# Patient Record
Sex: FEMALE | Race: WHITE | Employment: OTHER | URBAN - METROPOLITAN AREA
[De-identification: names, ages, dates, MRNs, and addresses within clinical notes are randomized per-mention and may not be internally consistent; named-entity substitution may affect disease eponyms.]

---

## 2019-03-22 ENCOUNTER — APPOINTMENT (OUTPATIENT)
Dept: GENERAL RADIOLOGY | Age: 84
DRG: 064 | End: 2019-03-22
Payer: MEDICARE

## 2019-03-22 ENCOUNTER — HOSPITAL ENCOUNTER (INPATIENT)
Age: 84
LOS: 4 days | Discharge: NURSING, REHAB OR LONG-TERM CARE | DRG: 064 | End: 2019-03-26
Attending: EMERGENCY MEDICINE
Payer: MEDICARE

## 2019-03-22 ENCOUNTER — APPOINTMENT (OUTPATIENT)
Dept: CT IMAGING | Age: 84
DRG: 064 | End: 2019-03-22
Payer: MEDICARE

## 2019-03-22 DIAGNOSIS — I63.511 ACUTE ISCHEMIC RIGHT MCA STROKE (HCC): Primary | ICD-10-CM

## 2019-03-22 PROBLEM — I63.9 ISCHEMIC STROKE (HCC): Status: ACTIVE | Noted: 2019-03-22

## 2019-03-22 PROBLEM — G45.9 TIA (TRANSIENT ISCHEMIC ATTACK): Status: ACTIVE | Noted: 2019-03-22

## 2019-03-22 LAB
ALBUMIN SERPL-MCNC: 3.1 G/DL (ref 3.4–5)
ALP BLD-CCNC: 61 U/L (ref 40–129)
ALT SERPL-CCNC: 11 U/L (ref 10–40)
ANION GAP SERPL CALCULATED.3IONS-SCNC: 11 MMOL/L (ref 3–16)
AST SERPL-CCNC: 23 U/L (ref 15–37)
BASOPHILS ABSOLUTE: 0.1 K/UL (ref 0–0.2)
BASOPHILS RELATIVE PERCENT: 1 %
BILIRUB SERPL-MCNC: 0.3 MG/DL (ref 0–1)
BILIRUBIN DIRECT: <0.2 MG/DL (ref 0–0.3)
BILIRUBIN, INDIRECT: ABNORMAL MG/DL (ref 0–1)
BUN BLDV-MCNC: 24 MG/DL (ref 7–20)
CALCIUM SERPL-MCNC: 8.8 MG/DL (ref 8.3–10.6)
CHLORIDE BLD-SCNC: 103 MMOL/L (ref 99–110)
CO2: 25 MMOL/L (ref 21–32)
CREAT SERPL-MCNC: 0.6 MG/DL (ref 0.6–1.2)
EKG ATRIAL RATE: 234 BPM
EKG DIAGNOSIS: NORMAL
EKG P AXIS: 93 DEGREES
EKG Q-T INTERVAL: 430 MS
EKG QRS DURATION: 98 MS
EKG QTC CALCULATION (BAZETT): 477 MS
EKG R AXIS: -45 DEGREES
EKG T AXIS: 55 DEGREES
EKG VENTRICULAR RATE: 74 BPM
EOSINOPHILS ABSOLUTE: 0.3 K/UL (ref 0–0.6)
EOSINOPHILS RELATIVE PERCENT: 5.7 %
GFR AFRICAN AMERICAN: >60
GFR NON-AFRICAN AMERICAN: >60
GLUCOSE BLD-MCNC: 117 MG/DL (ref 70–99)
GLUCOSE BLD-MCNC: 190 MG/DL (ref 70–99)
GLUCOSE BLD-MCNC: 79 MG/DL (ref 70–99)
HCT VFR BLD CALC: 30.2 % (ref 36–48)
HEMOGLOBIN: 9.7 G/DL (ref 12–16)
INR BLD: 1.07 (ref 0.86–1.14)
LACTIC ACID: 1.7 MMOL/L (ref 0.4–2)
LYMPHOCYTES ABSOLUTE: 0.8 K/UL (ref 1–5.1)
LYMPHOCYTES RELATIVE PERCENT: 17.2 %
MCH RBC QN AUTO: 27.7 PG (ref 26–34)
MCHC RBC AUTO-ENTMCNC: 31.9 G/DL (ref 31–36)
MCV RBC AUTO: 86.8 FL (ref 80–100)
MONOCYTES ABSOLUTE: 0.4 K/UL (ref 0–1.3)
MONOCYTES RELATIVE PERCENT: 7.2 %
NEUTROPHILS ABSOLUTE: 3.4 K/UL (ref 1.7–7.7)
NEUTROPHILS RELATIVE PERCENT: 68.9 %
PDW BLD-RTO: 21.6 % (ref 12.4–15.4)
PERFORMED ON: ABNORMAL
PERFORMED ON: ABNORMAL
PLATELET # BLD: 221 K/UL (ref 135–450)
PMV BLD AUTO: 7.7 FL (ref 5–10.5)
POTASSIUM REFLEX MAGNESIUM: 5.6 MMOL/L (ref 3.5–5.1)
PROTHROMBIN TIME: 12.2 SEC (ref 9.8–13)
RBC # BLD: 3.48 M/UL (ref 4–5.2)
SODIUM BLD-SCNC: 139 MMOL/L (ref 136–145)
T4 TOTAL: 6.4 UG/DL (ref 4.5–10.9)
TOTAL PROTEIN: 6.7 G/DL (ref 6.4–8.2)
TROPONIN: <0.01 NG/ML
TSH SERPL DL<=0.05 MIU/L-ACNC: 2.71 UIU/ML (ref 0.27–4.2)
WBC # BLD: 4.9 K/UL (ref 4–11)

## 2019-03-22 PROCEDURE — 6370000000 HC RX 637 (ALT 250 FOR IP): Performed by: EMERGENCY MEDICINE

## 2019-03-22 PROCEDURE — 71045 X-RAY EXAM CHEST 1 VIEW: CPT

## 2019-03-22 PROCEDURE — 83605 ASSAY OF LACTIC ACID: CPT

## 2019-03-22 PROCEDURE — 99285 EMERGENCY DEPT VISIT HI MDM: CPT

## 2019-03-22 PROCEDURE — 80076 HEPATIC FUNCTION PANEL: CPT

## 2019-03-22 PROCEDURE — 94761 N-INVAS EAR/PLS OXIMETRY MLT: CPT

## 2019-03-22 PROCEDURE — 70450 CT HEAD/BRAIN W/O DYE: CPT

## 2019-03-22 PROCEDURE — 84484 ASSAY OF TROPONIN QUANT: CPT

## 2019-03-22 PROCEDURE — 70496 CT ANGIOGRAPHY HEAD: CPT

## 2019-03-22 PROCEDURE — 96361 HYDRATE IV INFUSION ADD-ON: CPT

## 2019-03-22 PROCEDURE — 84436 ASSAY OF TOTAL THYROXINE: CPT

## 2019-03-22 PROCEDURE — 6360000002 HC RX W HCPCS: Performed by: EMERGENCY MEDICINE

## 2019-03-22 PROCEDURE — 70498 CT ANGIOGRAPHY NECK: CPT

## 2019-03-22 PROCEDURE — 2000000000 HC ICU R&B

## 2019-03-22 PROCEDURE — 80048 BASIC METABOLIC PNL TOTAL CA: CPT

## 2019-03-22 PROCEDURE — 6360000004 HC RX CONTRAST MEDICATION: Performed by: EMERGENCY MEDICINE

## 2019-03-22 PROCEDURE — 84443 ASSAY THYROID STIM HORMONE: CPT

## 2019-03-22 PROCEDURE — 93005 ELECTROCARDIOGRAM TRACING: CPT | Performed by: EMERGENCY MEDICINE

## 2019-03-22 PROCEDURE — 36415 COLL VENOUS BLD VENIPUNCTURE: CPT

## 2019-03-22 PROCEDURE — 2580000003 HC RX 258: Performed by: EMERGENCY MEDICINE

## 2019-03-22 PROCEDURE — 2580000003 HC RX 258: Performed by: STUDENT IN AN ORGANIZED HEALTH CARE EDUCATION/TRAINING PROGRAM

## 2019-03-22 PROCEDURE — 96374 THER/PROPH/DIAG INJ IV PUSH: CPT

## 2019-03-22 PROCEDURE — 85025 COMPLETE CBC W/AUTO DIFF WBC: CPT

## 2019-03-22 PROCEDURE — 6370000000 HC RX 637 (ALT 250 FOR IP): Performed by: STUDENT IN AN ORGANIZED HEALTH CARE EDUCATION/TRAINING PROGRAM

## 2019-03-22 PROCEDURE — 85610 PROTHROMBIN TIME: CPT

## 2019-03-22 RX ORDER — 0.9 % SODIUM CHLORIDE 0.9 %
1000 INTRAVENOUS SOLUTION INTRAVENOUS ONCE
Status: COMPLETED | OUTPATIENT
Start: 2019-03-22 | End: 2019-03-22

## 2019-03-22 RX ORDER — DEXTROSE MONOHYDRATE 50 MG/ML
100 INJECTION, SOLUTION INTRAVENOUS PRN
Status: DISCONTINUED | OUTPATIENT
Start: 2019-03-22 | End: 2019-03-24

## 2019-03-22 RX ORDER — 0.9 % SODIUM CHLORIDE 0.9 %
500 INTRAVENOUS SOLUTION INTRAVENOUS ONCE
Status: COMPLETED | OUTPATIENT
Start: 2019-03-22 | End: 2019-03-22

## 2019-03-22 RX ORDER — ASPIRIN 81 MG/1
324 TABLET, CHEWABLE ORAL ONCE
Status: COMPLETED | OUTPATIENT
Start: 2019-03-22 | End: 2019-03-22

## 2019-03-22 RX ORDER — LOPERAMIDE HYDROCHLORIDE 2 MG/1
2 CAPSULE ORAL 4 TIMES DAILY PRN
COMMUNITY

## 2019-03-22 RX ORDER — PANTOPRAZOLE SODIUM 40 MG/10ML
40 INJECTION, POWDER, LYOPHILIZED, FOR SOLUTION INTRAVENOUS DAILY
Status: DISCONTINUED | OUTPATIENT
Start: 2019-03-23 | End: 2019-03-25 | Stop reason: ALTCHOICE

## 2019-03-22 RX ORDER — NICOTINE POLACRILEX 4 MG
15 LOZENGE BUCCAL PRN
Status: DISCONTINUED | OUTPATIENT
Start: 2019-03-22 | End: 2019-03-24

## 2019-03-22 RX ORDER — LABETALOL HYDROCHLORIDE 5 MG/ML
10 INJECTION, SOLUTION INTRAVENOUS EVERY 10 MIN PRN
Status: DISCONTINUED | OUTPATIENT
Start: 2019-03-22 | End: 2019-03-25

## 2019-03-22 RX ORDER — ERGOCALCIFEROL 1.25 MG/1
50000 CAPSULE ORAL
COMMUNITY

## 2019-03-22 RX ORDER — SODIUM CHLORIDE 0.9 % (FLUSH) 0.9 %
10 SYRINGE (ML) INJECTION EVERY 12 HOURS SCHEDULED
Status: DISCONTINUED | OUTPATIENT
Start: 2019-03-22 | End: 2019-03-26 | Stop reason: HOSPADM

## 2019-03-22 RX ORDER — ATORVASTATIN CALCIUM 40 MG/1
40 TABLET, FILM COATED ORAL NIGHTLY
Status: DISCONTINUED | OUTPATIENT
Start: 2019-03-22 | End: 2019-03-26 | Stop reason: HOSPADM

## 2019-03-22 RX ORDER — HEPARIN SODIUM 10000 [USP'U]/100ML
12 INJECTION, SOLUTION INTRAVENOUS CONTINUOUS
Status: DISCONTINUED | OUTPATIENT
Start: 2019-03-22 | End: 2019-03-24

## 2019-03-22 RX ORDER — SODIUM CHLORIDE 0.9 % (FLUSH) 0.9 %
10 SYRINGE (ML) INJECTION PRN
Status: DISCONTINUED | OUTPATIENT
Start: 2019-03-22 | End: 2019-03-26 | Stop reason: HOSPADM

## 2019-03-22 RX ORDER — ONDANSETRON 2 MG/ML
4 INJECTION INTRAMUSCULAR; INTRAVENOUS EVERY 6 HOURS PRN
Status: DISCONTINUED | OUTPATIENT
Start: 2019-03-22 | End: 2019-03-26 | Stop reason: HOSPADM

## 2019-03-22 RX ORDER — CARVEDILOL 6.25 MG/1
6.25 TABLET ORAL 2 TIMES DAILY WITH MEALS
COMMUNITY

## 2019-03-22 RX ORDER — ASPIRIN 81 MG/1
81 TABLET ORAL DAILY
Status: DISCONTINUED | OUTPATIENT
Start: 2019-03-23 | End: 2019-03-26 | Stop reason: HOSPADM

## 2019-03-22 RX ORDER — ACETAMINOPHEN 325 MG/1
650 TABLET ORAL EVERY 4 HOURS PRN
Status: DISCONTINUED | OUTPATIENT
Start: 2019-03-22 | End: 2019-03-26 | Stop reason: HOSPADM

## 2019-03-22 RX ORDER — DEXTROSE MONOHYDRATE 25 G/50ML
25 INJECTION, SOLUTION INTRAVENOUS ONCE
Status: COMPLETED | OUTPATIENT
Start: 2019-03-22 | End: 2019-03-22

## 2019-03-22 RX ORDER — SODIUM CHLORIDE, SODIUM LACTATE, POTASSIUM CHLORIDE, CALCIUM CHLORIDE 600; 310; 30; 20 MG/100ML; MG/100ML; MG/100ML; MG/100ML
INJECTION, SOLUTION INTRAVENOUS
Status: DISPENSED
Start: 2019-03-22 | End: 2019-03-23

## 2019-03-22 RX ORDER — DRONABINOL 2.5 MG/1
2.5 CAPSULE ORAL
COMMUNITY

## 2019-03-22 RX ORDER — DEXTROSE MONOHYDRATE 25 G/50ML
12.5 INJECTION, SOLUTION INTRAVENOUS PRN
Status: DISCONTINUED | OUTPATIENT
Start: 2019-03-22 | End: 2019-03-24

## 2019-03-22 RX ADMIN — SODIUM CHLORIDE 500 ML: 9 INJECTION, SOLUTION INTRAVENOUS at 16:42

## 2019-03-22 RX ADMIN — DEXTROSE MONOHYDRATE 25 G: 25 INJECTION, SOLUTION INTRAVENOUS at 18:45

## 2019-03-22 RX ADMIN — SODIUM CHLORIDE 1000 ML: 0.9 INJECTION, SOLUTION INTRAVENOUS at 18:51

## 2019-03-22 RX ADMIN — Medication 10 ML: at 22:13

## 2019-03-22 RX ADMIN — INSULIN HUMAN 10 UNITS: 100 INJECTION, SOLUTION PARENTERAL at 18:42

## 2019-03-22 RX ADMIN — DEXTROSE MONOHYDRATE 100 ML/HR: 50 INJECTION, SOLUTION INTRAVENOUS at 18:58

## 2019-03-22 RX ADMIN — ASPIRIN 81 MG 324 MG: 81 TABLET ORAL at 18:39

## 2019-03-22 RX ADMIN — HEPARIN SODIUM 12 UNITS/KG/HR: 10000 INJECTION, SOLUTION INTRAVENOUS at 18:30

## 2019-03-22 RX ADMIN — IOPAMIDOL 80 ML: 755 INJECTION, SOLUTION INTRAVENOUS at 15:42

## 2019-03-22 RX ADMIN — ATORVASTATIN CALCIUM 40 MG: 40 TABLET, FILM COATED ORAL at 22:12

## 2019-03-22 ASSESSMENT — PAIN SCALES - GENERAL: PAINLEVEL_OUTOF10: 0

## 2019-03-22 NOTE — ED NOTES
Pt's depends changed from urine incontinence and soft light brown stool. Buttocks is red around coccyx.       Juliann Cody RN  03/22/19 1950

## 2019-03-22 NOTE — ED PROVIDER NOTES
Alkaline Phosphatase 61 40 - 129 U/L    ALT 11 10 - 40 U/L    AST 23 15 - 37 U/L    Total Bilirubin 0.3 0.0 - 1.0 mg/dL    Bilirubin, Direct <0.2 0.0 - 0.3 mg/dL    Bilirubin, Indirect see below 0.0 - 1.0 mg/dL   Troponin   Result Value Ref Range    Troponin <0.01 <0.01 ng/mL   Protime-INR   Result Value Ref Range    Protime 12.2 9.8 - 13.0 sec    INR 1.07 0.86 - 1.14   Lactic Acid, Plasma   Result Value Ref Range    Lactic Acid 1.7 0.4 - 2.0 mmol/L   POCT Glucose   Result Value Ref Range    POC Glucose 117 (H) 70 - 99 mg/dl    Performed on ACCU-CHEK    EKG 12 Lead   Result Value Ref Range    Ventricular Rate 74 BPM    Atrial Rate 234 BPM    QRS Duration 98 ms    Q-T Interval 430 ms    QTc Calculation (Bazett) 477 ms    P Axis 93 degrees    R Axis -45 degrees    T Axis 55 degrees    Diagnosis       EKG performed in ER and to be interpreted by ER physician. Confirmed by MD, ER ([id number]),  Dilia Lopez ([id number]) on 3/22/2019 5:03:12 PM       ED BEDSIDE ULTRASOUND:      RECENT VITALS:  BP: (!) 147/76,  ,Pulse: 78, Resp: 18, SpO2: 100 %     Procedures         ED Course     Nursing Notes, Past Medical Hx, Past Surgical Hx, Social Hx, Allergies, and Family Hx werereviewed.     The patient was given thefollowing medications:  Orders Placed This Encounter   Medications    iopamidol (ISOVUE-370) 76 % injection 80 mL    lactated ringers infusion     ALONZO MARIE: cabinet override    DISCONTD: alteplase (ACTIVASE) 100 MG injection     Hannah Mendoza: cabinet override    0.9 % sodium chloride bolus    aspirin chewable tablet 324 mg    0.9 % sodium chloride bolus    AND Linked Order Group     insulin regular (HUMULIN R;NOVOLIN R) injection 10 Units     dextrose 50 % solution 25 g    glucose (GLUTOSE) 40 % oral gel 15 g    dextrose 50 % solution 12.5 g    glucagon (rDNA) injection 1 mg    dextrose 5 % solution    heparin 25,000 units in dextrose 5% 250 mL infusion       CONSULTS:  IP CONSULT TO NEUROSURGERY  IP CONSULT TO CRITICAL CARE  IP CONSULT TO HOSPITALIST  IP CONSULT TO CRITICAL CARE  IP CONSULT TO NEUROSURGERY    MEDICAL DECISION MAKING / ASSESSMENT / PLAN     Rolf Mason is a 80 y.o. female who presents with acute onset of left-sided weakness. Initial symptoms were concerning for right MCA occlusion and she was taken emergently back to CT. On my interpretation CT non-con was clean and with discussion with the stroke team there was concern for acute occlusion just distal to the trifurcation of the MCA. However, as she came off of the CT scanner she had complete resolution of her symptoms with an NIH stroke scale of 0. Initially I had a discussion with the son about treating with TPA however as her symptoms rapidly improved I elected not to treat with TPA. In discussion with the stroke team, neurosurgery, and the ICU, we will admit her to the ICU for q.1 hour neuro checks. If she experiences any acute worsening of her symptoms up to 8 hours after last known normal, which would be around 8:30 or 9:00 PM, neurosurgery will take her for intervention. Since she has been in the emergency room and she has had some waxing and waning symptoms of some mild left facial droop and mild left arm weakness not nearly as bad as when she initially presented. She was given IV fluids as there was some concern that she may be pressure dependent. She was mildly hypokalemic as well although EKG did not show any peaking of T waves. She was treated with insulin and glucose. She will be admitted to the ICU for further management of her acute stroke. Critical Care:  Due to the immediate potential for life-threatening deterioration due to acute stroke, I spent 45 minutes providing critical care. This time excludes timespent performing procedures but includes time spent on direct patient care, history retrieval, review of the chart, and discussions with patient, family, and consultant(s). Clinical Impression     1.

## 2019-03-23 ENCOUNTER — APPOINTMENT (OUTPATIENT)
Dept: CT IMAGING | Age: 84
DRG: 064 | End: 2019-03-23
Payer: MEDICARE

## 2019-03-23 ENCOUNTER — APPOINTMENT (OUTPATIENT)
Dept: MRI IMAGING | Age: 84
DRG: 064 | End: 2019-03-23
Payer: MEDICARE

## 2019-03-23 LAB
ALBUMIN SERPL-MCNC: 3 G/DL (ref 3.4–5)
ANION GAP SERPL CALCULATED.3IONS-SCNC: 10 MMOL/L (ref 3–16)
ANTI-XA UNFRAC HEPARIN: 0.2 IU/ML (ref 0.3–0.7)
ANTI-XA UNFRAC HEPARIN: 0.31 IU/ML (ref 0.3–0.7)
ANTI-XA UNFRAC HEPARIN: 0.45 IU/ML (ref 0.3–0.7)
APTT: 58.2 SEC (ref 26–36)
BASOPHILS ABSOLUTE: 0.1 K/UL (ref 0–0.2)
BASOPHILS RELATIVE PERCENT: 1.3 %
BUN BLDV-MCNC: 18 MG/DL (ref 7–20)
CALCIUM SERPL-MCNC: 8.4 MG/DL (ref 8.3–10.6)
CHLORIDE BLD-SCNC: 104 MMOL/L (ref 99–110)
CHOLESTEROL, TOTAL: 146 MG/DL (ref 0–199)
CO2: 23 MMOL/L (ref 21–32)
CREAT SERPL-MCNC: <0.5 MG/DL (ref 0.6–1.2)
EOSINOPHILS ABSOLUTE: 0.5 K/UL (ref 0–0.6)
EOSINOPHILS RELATIVE PERCENT: 8.8 %
GFR AFRICAN AMERICAN: >60
GFR NON-AFRICAN AMERICAN: >60
GLUCOSE BLD-MCNC: 77 MG/DL (ref 70–99)
HCT VFR BLD CALC: 30 % (ref 36–48)
HDLC SERPL-MCNC: 51 MG/DL (ref 40–60)
HEMOGLOBIN: 9.5 G/DL (ref 12–16)
LDL CHOLESTEROL CALCULATED: 79 MG/DL
LV EF: 58 %
LVEF MODALITY: NORMAL
LYMPHOCYTES ABSOLUTE: 1.2 K/UL (ref 1–5.1)
LYMPHOCYTES RELATIVE PERCENT: 22.8 %
MAGNESIUM: 1.9 MG/DL (ref 1.8–2.4)
MCH RBC QN AUTO: 27.7 PG (ref 26–34)
MCHC RBC AUTO-ENTMCNC: 31.7 G/DL (ref 31–36)
MCV RBC AUTO: 87.2 FL (ref 80–100)
MONOCYTES ABSOLUTE: 0.3 K/UL (ref 0–1.3)
MONOCYTES RELATIVE PERCENT: 6.2 %
NEUTROPHILS ABSOLUTE: 3.2 K/UL (ref 1.7–7.7)
NEUTROPHILS RELATIVE PERCENT: 60.9 %
PDW BLD-RTO: 22 % (ref 12.4–15.4)
PHOSPHORUS: 3 MG/DL (ref 2.5–4.9)
PLATELET # BLD: 239 K/UL (ref 135–450)
PMV BLD AUTO: 7.6 FL (ref 5–10.5)
POTASSIUM SERPL-SCNC: 3.9 MMOL/L (ref 3.5–5.1)
RBC # BLD: 3.44 M/UL (ref 4–5.2)
SODIUM BLD-SCNC: 137 MMOL/L (ref 136–145)
TRIGL SERPL-MCNC: 79 MG/DL (ref 0–150)
VLDLC SERPL CALC-MCNC: 16 MG/DL
WBC # BLD: 5.2 K/UL (ref 4–11)

## 2019-03-23 PROCEDURE — 2580000003 HC RX 258: Performed by: STUDENT IN AN ORGANIZED HEALTH CARE EDUCATION/TRAINING PROGRAM

## 2019-03-23 PROCEDURE — 6360000002 HC RX W HCPCS: Performed by: STUDENT IN AN ORGANIZED HEALTH CARE EDUCATION/TRAINING PROGRAM

## 2019-03-23 PROCEDURE — 70551 MRI BRAIN STEM W/O DYE: CPT

## 2019-03-23 PROCEDURE — 80069 RENAL FUNCTION PANEL: CPT

## 2019-03-23 PROCEDURE — 93306 TTE W/DOPPLER COMPLETE: CPT

## 2019-03-23 PROCEDURE — 99222 1ST HOSP IP/OBS MODERATE 55: CPT | Performed by: INTERNAL MEDICINE

## 2019-03-23 PROCEDURE — C9113 INJ PANTOPRAZOLE SODIUM, VIA: HCPCS | Performed by: STUDENT IN AN ORGANIZED HEALTH CARE EDUCATION/TRAINING PROGRAM

## 2019-03-23 PROCEDURE — 83735 ASSAY OF MAGNESIUM: CPT

## 2019-03-23 PROCEDURE — 92526 ORAL FUNCTION THERAPY: CPT

## 2019-03-23 PROCEDURE — 2580000003 HC RX 258

## 2019-03-23 PROCEDURE — 2000000000 HC ICU R&B

## 2019-03-23 PROCEDURE — 36415 COLL VENOUS BLD VENIPUNCTURE: CPT

## 2019-03-23 PROCEDURE — 85730 THROMBOPLASTIN TIME PARTIAL: CPT

## 2019-03-23 PROCEDURE — 92610 EVALUATE SWALLOWING FUNCTION: CPT

## 2019-03-23 PROCEDURE — 80061 LIPID PANEL: CPT

## 2019-03-23 PROCEDURE — 85520 HEPARIN ASSAY: CPT

## 2019-03-23 PROCEDURE — 6370000000 HC RX 637 (ALT 250 FOR IP): Performed by: STUDENT IN AN ORGANIZED HEALTH CARE EDUCATION/TRAINING PROGRAM

## 2019-03-23 PROCEDURE — 85025 COMPLETE CBC W/AUTO DIFF WBC: CPT

## 2019-03-23 RX ORDER — OXYCODONE HYDROCHLORIDE AND ACETAMINOPHEN 5; 325 MG/1; MG/1
1 TABLET ORAL EVERY 4 HOURS PRN
Status: DISCONTINUED | OUTPATIENT
Start: 2019-03-23 | End: 2019-03-25

## 2019-03-23 RX ORDER — SODIUM CHLORIDE 9 MG/ML
INJECTION, SOLUTION INTRAVENOUS CONTINUOUS
Status: DISCONTINUED | OUTPATIENT
Start: 2019-03-23 | End: 2019-03-26 | Stop reason: HOSPADM

## 2019-03-23 RX ORDER — SODIUM CHLORIDE 9 MG/ML
INJECTION, SOLUTION INTRAVENOUS
Status: COMPLETED
Start: 2019-03-23 | End: 2019-03-23

## 2019-03-23 RX ADMIN — SODIUM CHLORIDE: 9 INJECTION, SOLUTION INTRAVENOUS at 22:38

## 2019-03-23 RX ADMIN — Medication 10 ML: at 20:57

## 2019-03-23 RX ADMIN — Medication 10 ML: at 09:05

## 2019-03-23 RX ADMIN — ATORVASTATIN CALCIUM 40 MG: 40 TABLET, FILM COATED ORAL at 20:57

## 2019-03-23 RX ADMIN — PANTOPRAZOLE SODIUM 40 MG: 40 INJECTION, POWDER, FOR SOLUTION INTRAVENOUS at 09:05

## 2019-03-23 ASSESSMENT — PAIN SCALES - GENERAL
PAINLEVEL_OUTOF10: 0

## 2019-03-23 ASSESSMENT — ENCOUNTER SYMPTOMS
CHEST TIGHTNESS: 0
ABDOMINAL PAIN: 0
ABDOMINAL DISTENTION: 0
COUGH: 0
SHORTNESS OF BREATH: 0
WHEEZING: 0

## 2019-03-23 NOTE — PROGRESS NOTES
environment. Impression  Dysphagia Diagnosis: Mild to moderate pharyngeal stage dysphagia; Moderate to severe oral stage dysphagia    Dysphagia Impression : Pt with moderate-severe oropharyngeal stage dysphagia. Pt with oral aversion to all presented consistencies (puckering lips allowing none-minimal amount of PO entry), stating \"that's enough\" after each trial; pt denied pain, discomfort, eventually stated \"feeling full\". Pt with decreased laryngeal elevation upon palpation. No overt s/s aspiration or penetration evident. Pt denied presence of globus sensation. Per nurse pt has been refusing meds. Recommend puree and thin liquid diet at this time, meds crushed in puree, monitoring for s/s aspiration. If complications arise downgrade and notify speech. Dysphagia Outcome Severity Scale: Level 3: Moderate dysphagia- Total assisstance, supervision or strategies. Two or more diet consistencies restricted     Treatment Plan  Requires SLP Intervention: Yes  Duration/Frequency of Treatment: 3-4x/wk  D/C Recommendations: To be determined     Recommended Diet and Intervention  Diet Solids Recommendation: Dysphagia I Pureed  Liquid Consistency Recommendation: Thin  Recommended Form of Meds: Crushed in puree as able  Therapeutic Interventions: Diet tolerance monitoring;Patient/Family education;Oral care    Compensatory Swallowing Strategies  Compensatory Swallowing Strategies: Alternate solids and liquids;Eat/Feed slowly;Upright as possible for all oral intake; Check for pocketing of food on the Left    Treatment/Goals  Dysphagia Goals:   1 The patient will tolerate recommended diet without observed clinical signs of aspiration; 03/23: Pt tolerated trials, but accepted very lately PO. No s/s aspiration. Cont goal.  2 The patient/caregiver will demonstrate understanding of compensatory strategies and rationale for improved swallowing safety. 03/23: Pt educated re: diet recommendations, positioning for safe swallowing.  Pt

## 2019-03-23 NOTE — CONSULTS
Admit: 3/22/2019    Name:  Mireya Tillman  Room:  [facility]/9711-00  MRN:    [id number]    Critical Care Daily Progress Note for 3/23/2019     Interval History:     Patient seen and examined this morning. No overnight events. Patient sleepy but arousable. Initially there was concern for worsening facial droop; however, it was just due to patient's position in bed. No focal deficits noted on exam. Denies CP, SOB, abdominal pain, nausea or vomiting. Reports feeling hungry. Noted to have transient bradycardic episodes on tele. Review of Systems  Pertinent items are noted in HPI. Medications:   sodium chloride flush  10 mL Intravenous 2 times per day    atorvastatin  40 mg Oral Nightly    aspirin  81 mg Oral Daily    pantoprazole  40 mg Intravenous Daily     PRN Medications:  glucose, dextrose, glucagon (rDNA), dextrose, sodium chloride flush, magnesium hydroxide, ondansetron, acetaminophen, labetalol       Objective:     Temp  Av.2 °F (36.2 °C)  Min: 96.7 °F (35.9 °C)  Max: 97.5 °F (36.4 °C)  Pulse  Av.6  Min: 56  Max: 80  BP  Min: 108/64  Max: 163/85  SpO2  Av %  Min: 92 %  Max: 100 %  Patient Vitals for the past 4 hrs:   BP Temp Temp src Pulse Resp SpO2   19 1200 127/85 96.7 °F (35.9 °C) Axillary 56 16 99 %         Intake/Output Summary (Last 24 hours) at 3/23/2019 1518  Last data filed at 3/23/2019 1349  Gross per 24 hour   Intake 1903.3 ml   Output 600 ml   Net 1303.3 ml       Physical Exam:  General:  Sleepy. Frail appearing woman. Mucous Membranes:  Pink , anicteric  Neck: No JVD, no carotid bruit, no thyromegaly  Chest:  Clear to auscultation bilaterally, no added sounds  Cardiovascular:  RRR S1S2 heard, no murmurs or gallops  Abdomen:  Soft, undistended, non tender, no organomegaly, BS present  Extremities: No edema or cyanosis.  Distal pulses well felt  Neurological : no focal deficits    Lab Data:  Recent Labs     19  1654 19  0650   WBC 4.9 5.2   HGB 9.7*

## 2019-03-23 NOTE — PROGRESS NOTES
Pulmonary Consult    Patient's PCP: No primary care provider on file. Referred by: Lovetta Fothergill, MD for TIA    HISTORY OF PRESENT ILLNESS:    This is a  80 y.o. female with PMH of A.fib not on warfarin and others as listed below presented to the hospital with left side weakness and facial droop with slurred speech. While in the ED her neurological exam improved. CTA was concerning for right distal MCA occlusion. At the time of evaluation this morning, she was alert and communicating properly. There is no focal weakness in the extremities. She was asking for a \"sandwich\"  She denies any pain. No headache, nausea or vomiting. Past Medical / Surgical History:    Past Medical History:   Diagnosis Date    Atrial fibrillation ([address]     Cancer ([address] 1966    cervix    CHF (congestive heart failure) ([address]     Diverticulitis     Encephalopathy acute 7/2013    related to phenergan/SBO    GERD (gastroesophageal reflux disease)     Hematuria     Hypertension     LONG TERM ANTICOAGULENT USE     Osteoporosis     Severe mitral regurgitation     Severe tricuspid regurgitation     Small bowel obstruction ([address] 7/2013    respiratory failure requiring intubation     Past Surgical History:   Procedure Laterality Date    APPENDECTOMY  1960    EYE SURGERY Bilateral 2012    cataract    HYSTERECTOMY  1966       Medications Prior to Admission:    No current facility-administered medications on file prior to encounter. Current Outpatient Medications on File Prior to Encounter   Medication Sig Dispense Refill    carvedilol (COREG) 6.25 MG tablet Take 6.25 mg by mouth 2 times daily (with meals) HOLD for SBP less than 110 or HR less than 50      loperamide (IMODIUM) 2 MG capsule Take 2 mg by mouth 4 times daily as needed for Diarrhea      dronabinol (MARINOL) 2.5 MG capsule Take 2.5 mg by mouth 2 times daily (before meals).       benzocaine (ORAJEL) 10 % mucosal gel Take by mouth as needed (canker sore) right atrial pressure of 8 mmHg.     CT head  1.  Truncation with cut off of the MCA just distal to the trifurcation on the right, suspicious for an acute occlusion. 2.  Multifocal atherosclerotic disease with bulky calcification of the bilateral carotid bulbs as well as involving the intracranial internal carotid arteries.           3. Dominance of the right vertebral artery. Hypoplasia of the left vertebral artery, particularly the intracranial portion, however, this is patent. cxr  Right basilar pleural-parenchymal opacity. Assessment &Plan:    Patient Active Problem List:     Osteoporosis     CHF (congestive heart failure) ([facility])     Hematuria     Severe mitral regurgitation     Severe tricuspid regurgitation     Atrial fib/flutter, transient     Fall at home     Degenerative joint disease     GERD (gastroesophageal reflux disease)     SBO (small bowel obstruction) ([facility])     Hyponatremia     Acute respiratory failure ([facility])     Acute encephalopathy     Acute diastolic CHF (congestive heart failure) ([facility]     CAP (community acquired pneumonia)     TIA (transient ischemic attack)     Ischemic stroke ([facility]    TIA  A.fib/flutter, rate controlled, with borderline bradycardia   Chronic diastolic CHF - euvolemic      Echo, CT, CXR and EKG reviewed  Continue neurochecks  ASA  Heparin drip  OK with regular diet  Neurochecks    The patient and / or the family were informed of the results of any tests, a time was given to answer questions, a plan was proposed and they agreed with plan. Thank you Dr. Segura primary care provider on file. for the opportunity to be involved in this patients care.  If you have any questions or concerns please feel free to contact me  Full Code

## 2019-03-23 NOTE — PROGRESS NOTES
Called nursing home and spoke with them and updated patient's emergency contacts. They also stated patient has no advanced directives. Will continue to monitor.

## 2019-03-23 NOTE — PROGRESS NOTES
Ischemic stroke- distal R MCA occlusion.   -likely cardio-embolic  -cont heparin gtt  -ASA 81/ Lipitor  -neuro check  -ECHO ordered      Atrial fibrillation  Found to be in A flutter  no AC recently  -cont heparin gtt     GERD-on PPI       DVT Prophylaxis: heparin gtt  Diet: Diet NPO Effective Now  Code Status: Full Code           Alexandru Tolentino MD

## 2019-03-23 NOTE — CONSULTS
light touch throughout. Withdraws symmetrically to noxious stim. Cerebellar/coordination: Unable to formally assess  Tone: normal in all 4 extremities  Gait: Deferred for safety. All reports below personally reviewed & actual images reviewed where indicated. Pertinent positives & negatives are addressed in Assessment & Plan section of note    Imaging:    XR CHEST PORTABLE   Final Result   Impression: Right basilar pleural-parenchymal opacity. CTA HEAD AND NECK W CONTRAST   Final Result      1. Truncation with cut off of the MCA just distal to the trifurcation on the right, suspicious for an acute occlusion. 2.  Multifocal atherosclerotic disease with bulky calcification of the bilateral carotid bulbs as well as involving the intracranial internal carotid arteries. 3. Dominance of the right vertebral artery. Hypoplasia of the left vertebral artery, particularly the intracranial portion, however, this is patent. CT Head WO Contrast   Final Result      No intracranial acute abnormality or mass effect edema. I personally reviewed the CT and CTA     Laboratory Review: All results below personally reviewed. Pertinent positives & negatives are addressed in Assessment & Plan section of note  Recent Results (from the past 36 hour(s))   POCT Glucose    Collection Time: 03/22/19  3:40 PM   Result Value Ref Range    POC Glucose 117 (H) 70 - 99 mg/dl    Performed on ACCU-CHEK    EKG 12 Lead    Collection Time: 03/22/19  4:50 PM   Result Value Ref Range    Ventricular Rate 74 BPM    Atrial Rate 234 BPM    QRS Duration 98 ms    Q-T Interval 430 ms    QTc Calculation (Bazett) 477 ms    P Axis 93 degrees    R Axis -45 degrees    T Axis 55 degrees    Diagnosis       EKG performed in ER and to be interpreted by ER physician. Confirmed by MD, ER ([id number]),  [employer] ([id number]) on 3/22/2019 5:03:12 PM   CBC Auto Differential    Collection Time: 03/22/19  4:54 PM   Result Value Ref Range    WBC could be multiple small areas, after breakup and embolization of thrombus). Hemorrhagic conversion remains a serious concern, and she will need frequent neuro checks for the first 48 hours. Stat CT head for any change in Neuro status. Continue Statin    There is no indication from a neurological perspective for her to be on both ASA and anticoagulation simultaneously    Echocardiogram.  If TTE is negative, CRISTIN to look for cardioembolic etiology    Recommend EEG, given fluctuation mentation. This patient is very elderly, frail, and has a tenuous cardiac status, with multiple episodes of symptomatic bradycardia. She is currently 'full code'. An expedited discussion regarding appropriateness of this code status, and aggressive measures being proposed, including anticoagulation, is recommended. PT/OT/ Speech evaluation    Will follow. A copy of this note was provided for Dr Juani Mccoy MD     I discussed the plan and recommendations with the primary team: Paty Hidalgo and Lissette. I spent greater than 65 minutes reviewing this patient's chart; including vitals, imaging and labs, obtaining a history, performing an examination, coordinating care with the primary team.      Electronically signed by:    Al Villa.  Fidencio Hernandez MD  Consultant Neurologist  [address] [place] [address] [id number]  [phone]     3/23/2019,7:01 PM

## 2019-03-24 LAB
ALBUMIN SERPL-MCNC: 2.9 G/DL (ref 3.4–5)
ANION GAP SERPL CALCULATED.3IONS-SCNC: 10 MMOL/L (ref 3–16)
ANTI-XA UNFRAC HEPARIN: 0.51 IU/ML (ref 0.3–0.7)
BASOPHILS ABSOLUTE: 0.1 K/UL (ref 0–0.2)
BASOPHILS RELATIVE PERCENT: 1.3 %
BUN BLDV-MCNC: 17 MG/DL (ref 7–20)
CALCIUM SERPL-MCNC: 8.7 MG/DL (ref 8.3–10.6)
CHLORIDE BLD-SCNC: 106 MMOL/L (ref 99–110)
CO2: 24 MMOL/L (ref 21–32)
CREAT SERPL-MCNC: 0.6 MG/DL (ref 0.6–1.2)
EOSINOPHILS ABSOLUTE: 0.4 K/UL (ref 0–0.6)
EOSINOPHILS RELATIVE PERCENT: 8.5 %
GFR AFRICAN AMERICAN: >60
GFR NON-AFRICAN AMERICAN: >60
GLUCOSE BLD-MCNC: 79 MG/DL (ref 70–99)
HCT VFR BLD CALC: 29.3 % (ref 36–48)
HEMOGLOBIN: 9.5 G/DL (ref 12–16)
LYMPHOCYTES ABSOLUTE: 1.1 K/UL (ref 1–5.1)
LYMPHOCYTES RELATIVE PERCENT: 22.8 %
MAGNESIUM: 1.8 MG/DL (ref 1.8–2.4)
MCH RBC QN AUTO: 28.2 PG (ref 26–34)
MCHC RBC AUTO-ENTMCNC: 32.3 G/DL (ref 31–36)
MCV RBC AUTO: 87.4 FL (ref 80–100)
MONOCYTES ABSOLUTE: 0.3 K/UL (ref 0–1.3)
MONOCYTES RELATIVE PERCENT: 6.7 %
NEUTROPHILS ABSOLUTE: 3 K/UL (ref 1.7–7.7)
NEUTROPHILS RELATIVE PERCENT: 60.7 %
PDW BLD-RTO: 21.4 % (ref 12.4–15.4)
PHOSPHORUS: 3.2 MG/DL (ref 2.5–4.9)
PLATELET # BLD: 214 K/UL (ref 135–450)
PMV BLD AUTO: 7.7 FL (ref 5–10.5)
POTASSIUM SERPL-SCNC: 3.8 MMOL/L (ref 3.5–5.1)
RBC # BLD: 3.36 M/UL (ref 4–5.2)
SODIUM BLD-SCNC: 140 MMOL/L (ref 136–145)
WBC # BLD: 5 K/UL (ref 4–11)

## 2019-03-24 PROCEDURE — 85520 HEPARIN ASSAY: CPT

## 2019-03-24 PROCEDURE — 6370000000 HC RX 637 (ALT 250 FOR IP): Performed by: STUDENT IN AN ORGANIZED HEALTH CARE EDUCATION/TRAINING PROGRAM

## 2019-03-24 PROCEDURE — 36415 COLL VENOUS BLD VENIPUNCTURE: CPT

## 2019-03-24 PROCEDURE — 1200000000 HC SEMI PRIVATE

## 2019-03-24 PROCEDURE — 80069 RENAL FUNCTION PANEL: CPT

## 2019-03-24 PROCEDURE — 99232 SBSQ HOSP IP/OBS MODERATE 35: CPT | Performed by: INTERNAL MEDICINE

## 2019-03-24 PROCEDURE — 2580000003 HC RX 258: Performed by: STUDENT IN AN ORGANIZED HEALTH CARE EDUCATION/TRAINING PROGRAM

## 2019-03-24 PROCEDURE — 85025 COMPLETE CBC W/AUTO DIFF WBC: CPT

## 2019-03-24 PROCEDURE — C9113 INJ PANTOPRAZOLE SODIUM, VIA: HCPCS | Performed by: STUDENT IN AN ORGANIZED HEALTH CARE EDUCATION/TRAINING PROGRAM

## 2019-03-24 PROCEDURE — 6360000002 HC RX W HCPCS: Performed by: STUDENT IN AN ORGANIZED HEALTH CARE EDUCATION/TRAINING PROGRAM

## 2019-03-24 PROCEDURE — 83735 ASSAY OF MAGNESIUM: CPT

## 2019-03-24 RX ADMIN — SODIUM CHLORIDE: 9 INJECTION, SOLUTION INTRAVENOUS at 17:44

## 2019-03-24 RX ADMIN — Medication 10 ML: at 20:23

## 2019-03-24 RX ADMIN — ATORVASTATIN CALCIUM 40 MG: 40 TABLET, FILM COATED ORAL at 20:23

## 2019-03-24 RX ADMIN — ASPIRIN 81 MG: 81 TABLET, COATED ORAL at 09:09

## 2019-03-24 RX ADMIN — PANTOPRAZOLE SODIUM 40 MG: 40 INJECTION, POWDER, FOR SOLUTION INTRAVENOUS at 09:09

## 2019-03-24 ASSESSMENT — PAIN SCALES - GENERAL
PAINLEVEL_OUTOF10: 0

## 2019-03-24 NOTE — PROGRESS NOTES
[facility] MEDICINE  - PROGRESS NOTE    Admit Date: 3/22/2019         Interval History:     89yof,admitted with   acute onset left-sided hemiplegia  -imaging confirmed acute stroke. In the ED, had some waxing and waning symptoms of some mild left facial droop and mild left arm weakness and admitted to the ICU for further management of her acute stroke. On heparin for cardio embolic CVA -MRI with small infarct    Awake and alert today. Answers most questions approp.     Diet: DIET GENERAL;      Data:   Scheduled Meds: Reviewed  Continuous Infusions:   sodium chloride 50 mL/hr at 03/23/19 2238       Intake/Output Summary (Last 24 hours) at 3/24/2019 1608  Last data filed at 3/24/2019 1422  Gross per 24 hour   Intake 1539 ml   Output 200 ml   Net 1339 ml     CBC:   Recent Labs     03/24/19  0413   WBC 5.0   HGB 9.5*        BMP:  Recent Labs     03/24/19  0413      K 3.8      CO2 24   BUN 17   CREATININE 0.6   GLUCOSE 79     ABGs:   Lab Results   Component Value Date    PHART 7.422 07/03/2013    PO2ART 42 07/03/2013    RNV9HKJ 34 07/03/2013     INR:   Recent Labs     03/22/19  1654   INR 1.07     -----------------------------------------------------------------      Objective:   Vitals: /68   Pulse 73   Temp 97.4 °F (36.3 °C) (Oral)   Resp 22   Ht 5' 4\" (1.626 m)   Wt 87 lb 4.8 oz (39.6 kg)   SpO2 98%   BMI 14.99 kg/m²   General appearance: , appears stated age and cooperative  Skin: Skin color, texture, turgor normal.   HEENT: Head: Normocephalic, no lesions,   Neck:   Lungs: clear to auscultation bilaterally  Heart: regular rate and rhythm, S1, S2 normal, no murmur, click, rub or gallop  Abdomen: soft, non-tender; bowel sounds normal; no masses,  no organomegaly  Extremities: extremities normal, atraumatic, no cyanosis or edema  Lymphatic: No significant lymph node enlargement papable  Neurologic: Mental status:

## 2019-03-24 NOTE — FLOWSHEET NOTE
Patient called out asking to have the lights turned on. Breakfast tray arrived. Sat patient up and set up food. She knows her name but couldn't tell me the year or where we were. She denies any pain or sob. Patient follows commands.        03/24/19 0900   Vitals   Temp 97.3 °F (36.3 °C)   Temp Source Oral   Pulse 76   Heart Rate Source Monitor   Resp 19   BP (!) 141/86   MAP (mmHg) 101   BP Location Left upper arm   BP Upper/Lower Upper   BP Method Automatic   Patient Position Semi fowlers   Level of Consciousness 0   MEWS Score 1   Patient Currently in Pain Denies   Cardiac Rhythm ST   Oxygen Therapy   SpO2 100 %   O2 Device None (Room air)   Pain Assessment   Pain Assessment 0-10   Pain Level 0

## 2019-03-24 NOTE — SIGNIFICANT EVENT
Spoke to patient's Estephania Marinelli, who requested to have a discussion about patient's code status. Patient was full code; however, son would like patient to be a  limited code as he does not wish to see his mother undergo aggressive measures at this time. He said that she would not want to be intubated and he worries that she would not tolerate chest compressions/defrilation as she is a 80 and frail. He was not opposed to pressors, but wanted to leave that decision to the doctors if the issue arose. Lucy Kaminski was also interested in speaking to palliative care, will place consult.

## 2019-03-25 PROBLEM — E43 SEVERE MALNUTRITION (HCC): Chronic | Status: ACTIVE | Noted: 2019-03-25

## 2019-03-25 LAB
ALBUMIN SERPL-MCNC: 3 G/DL (ref 3.4–5)
ANION GAP SERPL CALCULATED.3IONS-SCNC: 12 MMOL/L (ref 3–16)
BASOPHILS ABSOLUTE: 0 K/UL (ref 0–0.2)
BASOPHILS RELATIVE PERCENT: 0.9 %
BUN BLDV-MCNC: 15 MG/DL (ref 7–20)
CALCIUM SERPL-MCNC: 8.6 MG/DL (ref 8.3–10.6)
CHLORIDE BLD-SCNC: 108 MMOL/L (ref 99–110)
CO2: 22 MMOL/L (ref 21–32)
CREAT SERPL-MCNC: 0.6 MG/DL (ref 0.6–1.2)
EOSINOPHILS ABSOLUTE: 0.3 K/UL (ref 0–0.6)
EOSINOPHILS RELATIVE PERCENT: 6.7 %
GFR AFRICAN AMERICAN: >60
GFR NON-AFRICAN AMERICAN: >60
GLUCOSE BLD-MCNC: 82 MG/DL (ref 70–99)
HCT VFR BLD CALC: 30.4 % (ref 36–48)
HEMOGLOBIN: 9.7 G/DL (ref 12–16)
LYMPHOCYTES ABSOLUTE: 0.8 K/UL (ref 1–5.1)
LYMPHOCYTES RELATIVE PERCENT: 16.9 %
MAGNESIUM: 1.9 MG/DL (ref 1.8–2.4)
MCH RBC QN AUTO: 27.8 PG (ref 26–34)
MCHC RBC AUTO-ENTMCNC: 31.8 G/DL (ref 31–36)
MCV RBC AUTO: 87.4 FL (ref 80–100)
MONOCYTES ABSOLUTE: 0.4 K/UL (ref 0–1.3)
MONOCYTES RELATIVE PERCENT: 7.2 %
NEUTROPHILS ABSOLUTE: 3.4 K/UL (ref 1.7–7.7)
NEUTROPHILS RELATIVE PERCENT: 68.3 %
PDW BLD-RTO: 21.7 % (ref 12.4–15.4)
PHOSPHORUS: 3 MG/DL (ref 2.5–4.9)
PLATELET # BLD: 197 K/UL (ref 135–450)
PMV BLD AUTO: 7.7 FL (ref 5–10.5)
POTASSIUM SERPL-SCNC: 4 MMOL/L (ref 3.5–5.1)
RBC # BLD: 3.48 M/UL (ref 4–5.2)
SODIUM BLD-SCNC: 142 MMOL/L (ref 136–145)
WBC # BLD: 5 K/UL (ref 4–11)

## 2019-03-25 PROCEDURE — 6370000000 HC RX 637 (ALT 250 FOR IP): Performed by: STUDENT IN AN ORGANIZED HEALTH CARE EDUCATION/TRAINING PROGRAM

## 2019-03-25 PROCEDURE — 83735 ASSAY OF MAGNESIUM: CPT

## 2019-03-25 PROCEDURE — 2580000003 HC RX 258: Performed by: STUDENT IN AN ORGANIZED HEALTH CARE EDUCATION/TRAINING PROGRAM

## 2019-03-25 PROCEDURE — C9113 INJ PANTOPRAZOLE SODIUM, VIA: HCPCS | Performed by: STUDENT IN AN ORGANIZED HEALTH CARE EDUCATION/TRAINING PROGRAM

## 2019-03-25 PROCEDURE — 1200000000 HC SEMI PRIVATE

## 2019-03-25 PROCEDURE — 97530 THERAPEUTIC ACTIVITIES: CPT

## 2019-03-25 PROCEDURE — 97166 OT EVAL MOD COMPLEX 45 MIN: CPT

## 2019-03-25 PROCEDURE — 99221 1ST HOSP IP/OBS SF/LOW 40: CPT | Performed by: NURSE PRACTITIONER

## 2019-03-25 PROCEDURE — 97161 PT EVAL LOW COMPLEX 20 MIN: CPT

## 2019-03-25 PROCEDURE — 85025 COMPLETE CBC W/AUTO DIFF WBC: CPT

## 2019-03-25 PROCEDURE — 6360000002 HC RX W HCPCS: Performed by: STUDENT IN AN ORGANIZED HEALTH CARE EDUCATION/TRAINING PROGRAM

## 2019-03-25 PROCEDURE — 80069 RENAL FUNCTION PANEL: CPT

## 2019-03-25 PROCEDURE — 97535 SELF CARE MNGMENT TRAINING: CPT

## 2019-03-25 PROCEDURE — 92526 ORAL FUNCTION THERAPY: CPT

## 2019-03-25 RX ORDER — PANTOPRAZOLE SODIUM 40 MG/1
40 TABLET, DELAYED RELEASE ORAL
Status: DISCONTINUED | OUTPATIENT
Start: 2019-03-26 | End: 2019-03-26 | Stop reason: HOSPADM

## 2019-03-25 RX ADMIN — Medication 10 ML: at 21:28

## 2019-03-25 RX ADMIN — PANTOPRAZOLE SODIUM 40 MG: 40 INJECTION, POWDER, FOR SOLUTION INTRAVENOUS at 08:47

## 2019-03-25 RX ADMIN — ASPIRIN 81 MG: 81 TABLET, COATED ORAL at 08:47

## 2019-03-25 RX ADMIN — Medication 10 ML: at 08:47

## 2019-03-25 RX ADMIN — ATORVASTATIN CALCIUM 40 MG: 40 TABLET, FILM COATED ORAL at 21:28

## 2019-03-25 ASSESSMENT — PAIN SCALES - GENERAL
PAINLEVEL_OUTOF10: 0

## 2019-03-25 NOTE — PROGRESS NOTES
regurgitation. Estimated pulmonary artery systolic pressure is at 67 mmHg   assuming a right atrial pressure of 8 mmHg. Assessment: 80year old with a-fib (not on [facility]) who presented with acute left sided weakness and was found to have MCA occlusion on CTA. Her symptoms improved and TPA was not given. Initial thought for thrombectomy but improved without treatment. MRI showed tiny left cerebellar infarct. Will need to consider risk/benefit of anti-coagulation. Little concern for hemorrhagic conversion of left cerebellar infarct but she did initially present with a LVO so her stroke mechanism mostly likely secondary to her atrial fibrillation.      Plan:  -81mg ASA daily if no anti-coagulation  -40mg atorvastatin PO daily  -No further inpatient neurologic workup    BALWINDER Jackson-CNP  [phone]

## 2019-03-25 NOTE — PROGRESS NOTES
26-50%  · Oral Nutrition Supplement (ONS) Orders: None  · Anthropometric Measures:  · Ht: 5' 4\" (162.6 cm)   · Current Body Wt: 87 lb 4.8 oz (39.6 kg)  · Admission Body Wt: 88 lb 13.5 oz (40.3 kg)  · Usual Body Wt: (135lb 5 years ago per EMR review; no recent wt hx)  · % Weight Change:    36% loss over 5 years per EMR review   · Ideal Body Wt: 120 lb (54.4 kg)   · BMI Classification: BMI <18.5 Underweight    Nutrition Interventions:   Continue current diet, Start ONS  Continued Inpatient Monitoring    Nutrition Evaluation:   · Evaluation: Goals set   · Goals: pt will tolerate diet and consume >50% of meals and ONS offered    · Monitoring: Meal Intake, Supplement Intake, Mental Status/Confusion      Electronically signed by Brenton Vaca RD, LD on 3/25/19 at 2:15 PM    Contact Number:   Pager: [phone]  Office: [phone]

## 2019-03-25 NOTE — PROGRESS NOTES
and age anticoagulation had been previously held and I am not sure this changes that. Recommendations:  Asa and lipitor for now  Consider anticoagulation with warfarin. I would be okay starting anytime given MRI results but risk may outweigh benefit.     A copy of this note was provided for MD Olive Wilkerson MD  [phone]  Evenings, weekends, and off weeks please discuss with the covering neurologist.

## 2019-03-25 NOTE — PROGRESS NOTES
Internal Medicine PGY-*** Resident Progress Note        PCP: No primary care provider on file. Date of Admission: 3/22/2019    Chief Complaint: L-sided weakness + Facial droop    Subjective:  No acute events overnight. Pt is sitting upright in recliner eating breakfast. While being AOx2 (self and place), she is an extremely poor historian. Pt will intermittently answer questions and otherwise ask to go back to bed. Denies fever, chills, HA, N/V, diarrhea, constipation, chest pain, SOB, chest tightness, palpitations, abdominal pain, dysuria, hematuria, hematochezia, paraesthesias, focal weakness. Medications:  Reviewed    Infusion Medications    sodium chloride 50 mL/hr at 03/24/19 1744     Scheduled Medications    sodium chloride flush  10 mL Intravenous 2 times per day    atorvastatin  40 mg Oral Nightly    aspirin  81 mg Oral Daily    pantoprazole  40 mg Intravenous Daily     PRN Meds: oxyCODONE-acetaminophen, sodium chloride flush, magnesium hydroxide, ondansetron, acetaminophen, labetalol      Intake/Output Summary (Last 24 hours) at 3/25/2019 1313  Last data filed at 3/25/2019 1000  Gross per 24 hour   Intake 1679 ml   Output 100 ml   Net 1579 ml       Physical Exam Performed:    /62   Pulse 80   Temp 97.8 °F (36.6 °C) (Axillary)   Resp 13   Ht 5' 4\" (1.626 m)   Wt 87 lb 4.8 oz (39.6 kg)   SpO2 96%   BMI 14.99 kg/m²     General appearance: No apparent distress, appears stated age and cooperative. HEENT: Pupils equal, round, and reactive to light. Conjunctivae/corneas clear. Neck: Supple, with full range of motion. No jugular venous distention. Trachea midline. Respiratory:  Normal respiratory effort. Clear to auscultation, bilaterally without Rales/Wheezes/Rhonchi. Cardiovascular: Regular rate and rhythm with normal S1/S2. +murmur(s)  Abdomen: Soft, non-tender, non-distended with normal bowel sounds. Musculoskeletal: No clubbing, cyanosis or edema bilaterally.   Full range of motion without deformity. Skin: Skin color, texture, turgor normal.  No rashes or lesions. Neurologic:  Neurovascularly intact without any focal sensory/motor deficits. Cranial nerves: II-XII intact, grossly non-focal. Hyporeflexic throughout. Finger-to-nose test WNL. Unable to assess gait. Psychiatric: Alert and oriented x2 (self and place), thought content appropriate, normal insight. Capillary Refill: Brisk,< 3 seconds   Peripheral Pulses: +2 palpable, equal bilaterally       Labs:   Recent Labs     03/23/19  0650 03/24/19  0413 03/25/19  0530   WBC 5.2 5.0 5.0   HGB 9.5* 9.5* 9.7*   HCT 30.0* 29.3* 30.4*    214 197     Recent Labs     03/23/19  0650 03/24/19  0413 03/25/19  0529    140 142   K 3.9 3.8 4.0    106 108   CO2 23 24 22   BUN 18 17 15   CREATININE <0.5* 0.6 0.6   CALCIUM 8.4 8.7 8.6   PHOS 3.0 3.2 3.0     Recent Labs     03/22/19  1654   AST 23   ALT 11   BILIDIR <0.2   BILITOT 0.3   ALKPHOS 61     Recent Labs     03/22/19  1654   INR 1.07     Recent Labs     03/22/19  1654   TROPONINI <0.01       Urinalysis:      Lab Results   Component Value Date    NITRU Negative 12/19/2013    WBCUA >100 12/19/2013    BACTERIA 1+ 12/19/2013    RBCUA 3-5 12/19/2013    BLOODU SMALL 12/19/2013    SPECGRAV 1.010 12/19/2013    GLUCOSEU Negative 12/19/2013    GLUCOSEU Negative 09/03/2010       Radiology:  MRI BRAIN WO CONTRAST   Final Result      1. Tiny focus of diffusion restriction on the posterior left cerebral hemisphere may reflect small focus of acute ischemia. No other areas of diffusion restriction noted. 2.  Diffuse cerebral atrophy with prominent periventricular and deep white matter changes consistent with chronic ischemic leukoencephalopathy. 3.  Partial opacification of the right mastoid air cells may reflect acute or chronic mastoiditis. Correlate clinically. XR CHEST PORTABLE   Final Result   Impression: Right basilar pleural-parenchymal opacity.       CTA NECK W

## 2019-03-25 NOTE — PROGRESS NOTES
Sodium 140 136 - 145 mmol/L    Potassium 3.8 3.5 - 5.1 mmol/L    Chloride 106 99 - 110 mmol/L    CO2 24 21 - 32 mmol/L    Anion Gap 10 3 - 16    Glucose 79 70 - 99 mg/dL    BUN 17 7 - 20 mg/dL    CREATININE 0.6 0.6 - 1.2 mg/dL    GFR Non-African American >60 >60    GFR African American >60 >60    Calcium 8.7 8.3 - 10.6 mg/dL    Phosphorus 3.2 2.5 - 4.9 mg/dL    Alb 2.9 (L) 3.4 - 5.0 g/dL   Magnesium    Collection Time: 03/24/19  4:13 AM   Result Value Ref Range    Magnesium 1.80 1.80 - 2.40 mg/dL   CBC auto differential    Collection Time: 03/24/19  4:13 AM   Result Value Ref Range    WBC 5.0 4.0 - 11.0 K/uL    RBC 3.36 (L) 4.00 - 5.20 M/uL    Hemoglobin 9.5 (L) 12.0 - 16.0 g/dL    Hematocrit 29.3 (L) 36.0 - 48.0 %    MCV 87.4 80.0 - 100.0 fL    MCH 28.2 26.0 - 34.0 pg    MCHC 32.3 31.0 - 36.0 g/dL    RDW 21.4 (H) 12.4 - 15.4 %    Platelets 757 540 - 349 K/uL    MPV 7.7 5.0 - 10.5 fL    Neutrophils % 60.7 %    Lymphocytes % 22.8 %    Monocytes % 6.7 %    Eosinophils % 8.5 %    Basophils % 1.3 %    Neutrophils # 3.0 1.7 - 7.7 K/uL    Lymphocytes # 1.1 1.0 - 5.1 K/uL    Monocytes # 0.3 0.0 - 1.3 K/uL    Eosinophils # 0.4 0.0 - 0.6 K/uL    Basophils # 0.1 0.0 - 0.2 K/uL   Anti-Xa Unfract Heparin    Collection Time: 03/24/19  4:13 AM   Result Value Ref Range    Anti-XA Unfrac Heparin 0.51 0.30 - 0.70 IU/mL          Studies    MRI Brain wo contrast:     Impression:  1.  Tiny focus of diffusion restriction on the posterior left cerebral hemisphere may reflect small focus of acute ischemia.  No other areas of diffusion restriction noted.       2.  Diffuse cerebral atrophy with prominent periventricular and deep white matter changes consistent with chronic ischemic leukoencephalopathy.       3.  Partial opacification of the right mastoid air cells may reflect acute or chronic mastoiditis. I have personally reviewed the MRI images.       Trans Throracic Echo:  Conclusions      Summary   Left ventricular cavity size is

## 2019-03-25 NOTE — CONSULTS
[facility]  Palliative Medicine Consultation Note      Date Of Admission:3/22/2019  Date of consult: 03/23/2019  Seen by [facility] in the past:  No    Recommendations:        PC met with patient who was alert and oriented X 2 today. Her son was at the bedside, and the patient appears to be doing remarkably well considering the situation. Patient's son reported that she gets good care at the facility and he confirmed that she is a DNR/CC but was comfortable with transition to ICU for pressor support only. Patient denies any symptoms today and was very pleasant and sharp for her age and situation. 1. Goals of Care/Advanced Care planning/Code status: Limited: yes to pressors only, will need to return to a DNR/CC at discharge. 2. Pain:patient denies  3. SOB: patient denies on RA  4. Stroke: Neurology present and doing assessment. 5. Weakness: PT/OT evaluated patient, per son, she pulls herself around in the W/C at the NH  6. Dysphagia: speech recommended regular diet with thin liquids. 7. Disposition: RN/CM working on discharge plan, patient will likely return to her long term NH placement at [address] [place] [address]. Reason for Consult:         __x___ Goals of Care  __x___ Code Status Discussion/Advanced Care Planning   __x___ Psychosocial/Family Support  _____ Symptom Management  _____ Other (Specify)    Requesting Physician: Dr. Yves Martinez:  Left sided weakness     History Obtained From:  EMR    History of Present Illness:         Patient is a 80 y.o. female  who has a PMHx for Afib (no AC), HTN, GERD, and HfpEF who presented to [facility] ED from her NH with L sided weakness, facial droop and slurred speech. Patient could not provide any more history at this time due to confusion and in the Ed and stroke team was consulted.  CT head neg for any acute hemorrhage and CTA was concerning for R distal MCA occlusion and tPA was not given because patient's neurological exam improved dramatically 197 03/25/2019    MCV 87.4 03/25/2019    MCH 27.8 03/25/2019    MCHC 31.8 03/25/2019    RDW 21.7 03/25/2019    SEGSPCT 70.5 06/12/2013    LYMPHOPCT 16.9 03/25/2019    MONOPCT 7.2 03/25/2019    EOSPCT 5.9 02/29/2012    BASOPCT 0.9 03/25/2019    MONOSABS 0.4 03/25/2019    LYMPHSABS 0.8 03/25/2019    EOSABS 0.3 03/25/2019    BASOSABS 0.0 03/25/2019    DIFFTYPE Auto-K 06/12/2013     CMP:    Lab Results   Component Value Date     03/25/2019    K 4.0 03/25/2019    K 5.6 03/22/2019     03/25/2019    CO2 22 03/25/2019    BUN 15 03/25/2019    CREATININE 0.6 03/25/2019    GFRAA >60 03/25/2019    GFRAA >60 06/12/2013    AGRATIO 1.2 12/15/2013    LABGLOM >60 03/25/2019    GLUCOSE 82 03/25/2019    PROT 6.7 03/22/2019    PROT 7.2 02/29/2012    LABALBU 3.0 03/25/2019    CALCIUM 8.6 03/25/2019    BILITOT 0.3 03/22/2019    ALKPHOS 61 03/22/2019    AST 23 03/22/2019    ALT 11 03/22/2019     Hepatic Function Panel:    Lab Results   Component Value Date    ALKPHOS 61 03/22/2019    ALT 11 03/22/2019    AST 23 03/22/2019    PROT 6.7 03/22/2019    PROT 7.2 02/29/2012    BILITOT 0.3 03/22/2019    BILIDIR <0.2 03/22/2019    IBILI see below 03/22/2019    LABALBU 3.0 03/25/2019     Albumin:    Lab Results   Component Value Date    LABALBU 3.0 03/25/2019         Conclusion/Time spent:         Recommendations see above    Time spent with patient and/or family: 20  Time reviewing records: 10  Time communicating with staff: 10    A total of 40 minutes spent with the patient and family on unit greater than 50% in counseling regarding palliative care and in goals of care for the patient. Thank you to Dr. Mirlande Rodrigues for this consultation. We will continue to follow Ms. Faith's care as needed.       Duyen Caruso, APRN/CNP  Palliative Care  [phone]

## 2019-03-25 NOTE — PLAN OF CARE
Problem: Nutrition  Goal: Optimal nutrition therapy  Outcome: Ongoing   Nutrition Problem: Inadequate oral intake  Intervention: Food and/or Nutrient Delivery: Continue current diet, Start ONS  Nutritional Goals: pt will tolerate diet and consume >50% of meals and ONS offered

## 2019-03-26 VITALS
HEART RATE: 77 BPM | BODY MASS INDEX: 15.06 KG/M2 | RESPIRATION RATE: 15 BRPM | SYSTOLIC BLOOD PRESSURE: 137 MMHG | TEMPERATURE: 98.2 F | WEIGHT: 88.18 LBS | OXYGEN SATURATION: 95 % | DIASTOLIC BLOOD PRESSURE: 68 MMHG | HEIGHT: 64 IN

## 2019-03-26 LAB
ALBUMIN SERPL-MCNC: 3.1 G/DL (ref 3.4–5)
ANION GAP SERPL CALCULATED.3IONS-SCNC: 11 MMOL/L (ref 3–16)
BASOPHILS ABSOLUTE: 0.1 K/UL (ref 0–0.2)
BASOPHILS RELATIVE PERCENT: 0.9 %
BUN BLDV-MCNC: 14 MG/DL (ref 7–20)
CALCIUM SERPL-MCNC: 8.5 MG/DL (ref 8.3–10.6)
CHLORIDE BLD-SCNC: 106 MMOL/L (ref 99–110)
CO2: 23 MMOL/L (ref 21–32)
CREAT SERPL-MCNC: 0.5 MG/DL (ref 0.6–1.2)
EOSINOPHILS ABSOLUTE: 0.3 K/UL (ref 0–0.6)
EOSINOPHILS RELATIVE PERCENT: 4.5 %
GFR AFRICAN AMERICAN: >60
GFR NON-AFRICAN AMERICAN: >60
GLUCOSE BLD-MCNC: 81 MG/DL (ref 70–99)
HCT VFR BLD CALC: 30.7 % (ref 36–48)
HEMOGLOBIN: 9.8 G/DL (ref 12–16)
LYMPHOCYTES ABSOLUTE: 1 K/UL (ref 1–5.1)
LYMPHOCYTES RELATIVE PERCENT: 15.7 %
MAGNESIUM: 1.7 MG/DL (ref 1.8–2.4)
MCH RBC QN AUTO: 27.5 PG (ref 26–34)
MCHC RBC AUTO-ENTMCNC: 32 G/DL (ref 31–36)
MCV RBC AUTO: 85.9 FL (ref 80–100)
MONOCYTES ABSOLUTE: 0.4 K/UL (ref 0–1.3)
MONOCYTES RELATIVE PERCENT: 6 %
NEUTROPHILS ABSOLUTE: 4.7 K/UL (ref 1.7–7.7)
NEUTROPHILS RELATIVE PERCENT: 72.9 %
PDW BLD-RTO: 21.7 % (ref 12.4–15.4)
PHOSPHORUS: 2.9 MG/DL (ref 2.5–4.9)
PLATELET # BLD: 187 K/UL (ref 135–450)
PMV BLD AUTO: 8 FL (ref 5–10.5)
POTASSIUM SERPL-SCNC: 3.9 MMOL/L (ref 3.5–5.1)
RBC # BLD: 3.58 M/UL (ref 4–5.2)
SODIUM BLD-SCNC: 140 MMOL/L (ref 136–145)
WBC # BLD: 6.4 K/UL (ref 4–11)

## 2019-03-26 PROCEDURE — 6370000000 HC RX 637 (ALT 250 FOR IP): Performed by: STUDENT IN AN ORGANIZED HEALTH CARE EDUCATION/TRAINING PROGRAM

## 2019-03-26 PROCEDURE — 2580000003 HC RX 258: Performed by: STUDENT IN AN ORGANIZED HEALTH CARE EDUCATION/TRAINING PROGRAM

## 2019-03-26 PROCEDURE — 85025 COMPLETE CBC W/AUTO DIFF WBC: CPT

## 2019-03-26 PROCEDURE — 83735 ASSAY OF MAGNESIUM: CPT

## 2019-03-26 PROCEDURE — 94761 N-INVAS EAR/PLS OXIMETRY MLT: CPT

## 2019-03-26 PROCEDURE — 6360000002 HC RX W HCPCS: Performed by: STUDENT IN AN ORGANIZED HEALTH CARE EDUCATION/TRAINING PROGRAM

## 2019-03-26 PROCEDURE — 36415 COLL VENOUS BLD VENIPUNCTURE: CPT

## 2019-03-26 PROCEDURE — 80069 RENAL FUNCTION PANEL: CPT

## 2019-03-26 PROCEDURE — 92526 ORAL FUNCTION THERAPY: CPT

## 2019-03-26 RX ORDER — MAGNESIUM SULFATE 1 G/100ML
1 INJECTION INTRAVENOUS ONCE
Status: COMPLETED | OUTPATIENT
Start: 2019-03-26 | End: 2019-03-26

## 2019-03-26 RX ADMIN — ACETAMINOPHEN 650 MG: 325 TABLET ORAL at 06:03

## 2019-03-26 RX ADMIN — PANTOPRAZOLE SODIUM 40 MG: 40 TABLET, DELAYED RELEASE ORAL at 06:03

## 2019-03-26 RX ADMIN — MAGNESIUM SULFATE HEPTAHYDRATE 1 G: 1 INJECTION, SOLUTION INTRAVENOUS at 11:53

## 2019-03-26 RX ADMIN — SODIUM CHLORIDE: 9 INJECTION, SOLUTION INTRAVENOUS at 11:58

## 2019-03-26 RX ADMIN — ASPIRIN 81 MG: 81 TABLET, COATED ORAL at 09:27

## 2019-03-26 RX ADMIN — Medication 10 ML: at 09:27

## 2019-03-26 ASSESSMENT — PAIN SCALES - GENERAL
PAINLEVEL_OUTOF10: 0
PAINLEVEL_OUTOF10: 0
PAINLEVEL_OUTOF10: 2

## 2019-03-26 NOTE — DISCHARGE SUMMARY
artery. Hypoplasia of the left vertebral artery, particularly the intracranial portion, however, this is patent. CT Head WO Contrast   Final Result      No intracranial acute abnormality or mass effect edema. Consults:     IP CONSULT TO NEUROSURGERY  IP CONSULT TO CRITICAL CARE  IP CONSULT TO HOSPITALIST  IP CONSULT TO CRITICAL CARE  IP CONSULT TO NEUROSURGERY  IP CONSULT TO PALLIATIVE CARE  IP CONSULT TO NEUROLOGY    Disposition:  SNF     Condition at Discharge: Stable    Discharge Instructions/Follow-up:  PCP follow up, Nerology follow up     Code Status:  Limited     Activity: activity as tolerated    Diet: regular diet      Discharge Medications:     Current Discharge Medication List           Details   carvedilol (COREG) 6.25 MG tablet Take 6.25 mg by mouth 2 times daily (with meals) HOLD for SBP less than 110 or HR less than 50      loperamide (IMODIUM) 2 MG capsule Take 2 mg by mouth 4 times daily as needed for Diarrhea      dronabinol (MARINOL) 2.5 MG capsule Take 2.5 mg by mouth 2 times daily (before meals). benzocaine (ORAJEL) 10 % mucosal gel Take by mouth as needed (canker sore) Take by mouth as needed. vitamin D (ERGOCALCIFEROL) 41372 units CAPS capsule Take 50,000 Units by mouth every 30 days On the 5th of every month      acetaminophen 650 MG TABS Take 650 mg by mouth every 4 hours as needed for Pain (For mild pain level 1-3 or for fever > 100.5). Qty: 120 tablet      aspirin 81 MG EC tablet Take 81 mg by mouth daily. therapeutic multivitamin-minerals (THERAGRAN-M) tablet Take 1 tablet by mouth daily. Time Spent on discharge is more than 45 minutes in the examination, evaluation, counseling and review of medications and discharge plan. Signed:    Og Mcdermott MD   3/26/2019      Thank you No primary care provider on file. for the opportunity to be involved in this patient's care.

## 2019-03-26 NOTE — DISCHARGE INSTR - COC
Continuity of Care Form    Patient Name: Nusrat Camejo   :  3/8/1921  MRN:  [id number]    Admit date:  3/22/2019  Discharge date:  3/26/2019    Code Status Order: Limited   Advance Directives:   [facility] Documentation     Date/Time Healthcare Directive Type of Healthcare Directive Copy in [facility] [address] Agent's Name Healthcare Agent's Phone Number    [phone]  No, patient does not have an advance directive for healthcare treatment -- -- -- -- --          Admitting Physician:  No admitting provider for patient encounter. PCP: No primary care provider on file. Discharging Nurse: Roxy Messer 151 Unit/Room#: 0665/3404-07  Discharging Unit Phone Number: [phone]    Emergency Contact:   Extended Emergency Contact Information  Primary Emergency Contact: Elias Toro  Address: [address] [place] [address]  Home Phone: [phone]  Work Phone: [phone]  Relation: Child  Secondary Emergency Contact: CortezJohn  Home Phone: [phone]  Relation: Child    Past Surgical History:  Past Surgical History:   Procedure Laterality Date    APPENDECTOMY  1960    EYE SURGERY Bilateral 2012    cataract    HYSTERECTOMY  1966       Immunization History:   Immunization History   Administered Date(s) Administered    Influenza Virus Vaccine 10/17/2012    Zoster Live (Zostavax) 2013       Active Problems:  Patient Active Problem List   Diagnosis Code    Osteoporosis M81.0    CHF (congestive heart failure) ([facility] [address] I50.9    Hematuria     Severe mitral regurgitation I34.0    Severe tricuspid regurgitation I07.1    Atrial fib/flutter, transient [id number]    Fall at home [address] [place] [place], Y92.009    Degenerative joint disease M19.90    GERD (gastroesophageal reflux disease) K21.9    SBO (small bowel obstruction) ([facility]) K56.609    Hyponatremia E87.1    Acute respiratory failure ([facility]) J96.00    Acute encephalopathy G93.40    Acute

## 2019-03-26 NOTE — PLAN OF CARE
0630 pt sp02 dropped to 86% while sleeping, pt placed on 2L/NC and returned to 100%, will continue to monitor.